# Patient Record
Sex: FEMALE | Race: WHITE | NOT HISPANIC OR LATINO | Employment: UNEMPLOYED | URBAN - METROPOLITAN AREA
[De-identification: names, ages, dates, MRNs, and addresses within clinical notes are randomized per-mention and may not be internally consistent; named-entity substitution may affect disease eponyms.]

---

## 2024-01-28 ENCOUNTER — OFFICE VISIT (OUTPATIENT)
Dept: URGENT CARE | Facility: URGENT CARE | Age: 39
End: 2024-01-28
Payer: COMMERCIAL

## 2024-01-28 ENCOUNTER — ANCILLARY PROCEDURE (OUTPATIENT)
Dept: GENERAL RADIOLOGY | Facility: CLINIC | Age: 39
End: 2024-01-28
Attending: PHYSICIAN ASSISTANT
Payer: COMMERCIAL

## 2024-01-28 VITALS
WEIGHT: 167.4 LBS | TEMPERATURE: 98.2 F | DIASTOLIC BLOOD PRESSURE: 84 MMHG | HEART RATE: 106 BPM | RESPIRATION RATE: 16 BRPM | SYSTOLIC BLOOD PRESSURE: 125 MMHG | OXYGEN SATURATION: 99 %

## 2024-01-28 DIAGNOSIS — S92.514A CLOSED NONDISPLACED FRACTURE OF PROXIMAL PHALANX OF LESSER TOE OF RIGHT FOOT, INITIAL ENCOUNTER: Primary | ICD-10-CM

## 2024-01-28 DIAGNOSIS — S99.921A FOOT INJURY, RIGHT, INITIAL ENCOUNTER: ICD-10-CM

## 2024-01-28 PROCEDURE — 99203 OFFICE O/P NEW LOW 30 MIN: CPT | Performed by: PHYSICIAN ASSISTANT

## 2024-01-28 PROCEDURE — 73630 X-RAY EXAM OF FOOT: CPT | Mod: TC | Performed by: RADIOLOGY

## 2024-01-28 RX ORDER — CITALOPRAM HYDROBROMIDE 40 MG/1
40 TABLET ORAL
COMMUNITY
Start: 2023-10-06

## 2024-01-28 RX ORDER — HYDROCODONE BITARTRATE AND ACETAMINOPHEN 5; 325 MG/1; MG/1
1 TABLET ORAL EVERY 6 HOURS PRN
Qty: 10 TABLET | Refills: 0 | Status: SHIPPED | OUTPATIENT
Start: 2024-01-28

## 2024-01-28 RX ORDER — NORETHINDRONE ACETATE AND ETHINYL ESTRADIOL .03; 1.5 MG/1; MG/1
TABLET ORAL
COMMUNITY

## 2024-01-28 RX ORDER — HYDROCODONE BITARTRATE AND ACETAMINOPHEN 5; 325 MG/1; MG/1
1 TABLET ORAL EVERY 6 HOURS PRN
Qty: 10 TABLET | Refills: 0 | OUTPATIENT
Start: 2024-01-28 | End: 2024-01-28

## 2024-01-28 ASSESSMENT — ENCOUNTER SYMPTOMS
JOINT SWELLING: 1
BACK PAIN: 0
PALPITATIONS: 0
NECK STIFFNESS: 0
WOUND: 0
COLOR CHANGE: 1
ARTHRALGIAS: 1
CARDIOVASCULAR NEGATIVE: 1
CHILLS: 0
MYALGIAS: 1
NECK PAIN: 0
FEVER: 0
FATIGUE: 0

## 2024-01-28 ASSESSMENT — PAIN SCALES - GENERAL: PAINLEVEL: MODERATE PAIN (4)

## 2024-01-28 NOTE — PROGRESS NOTES
Milan Lindsey is a 38 year old, presenting for the following health issues with spouse:  Fall (Pt fell down the stairs. Onset- Saturday ) and Toe Injury (Right pinky toe swelling and pain. )    HPI   Musculoskeletal problem/pain  Onset/Duration: yesterday  Description  Location: R foot  Joint Swelling: Yes with bruising at the 5th toe  Redness: no  Pain: YES  Warmth: no  Intensity:  moderate  Progression of Symptoms:  same  Accompanying signs and symptoms:   Fevers: no  Numbness/tingling/weakness: no  History  Trauma to the area: Yes, sustained a R foot injury after slipping and falling on her stairs at home.  No head or neck injuries or LOC   Recent illness:  no  Previous similar problem: no  Previous evaluation:  no  Precipitating or alleviating factors:  Aggravating factors include: palpation, standing, walking, overuse  Therapies tried and outcome: rest/inactivity, ice, immobilization, naproxen, Ibuprofen, with minimal relief    There is no problem list on file for this patient.    Current Outpatient Medications   Medication    citalopram (CELEXA) 40 MG tablet    norethindrone-ethinyl estradiol (MICROGESTIN 1.5/30) 1.5-30 MG-MCG tablet     No current facility-administered medications for this visit.        Allergies   Allergen Reactions    Cefaclor Hives       Review of Systems   Constitutional:  Negative for chills, fatigue and fever.   Cardiovascular: Negative.  Negative for chest pain, palpitations and leg swelling.   Musculoskeletal:  Positive for arthralgias, gait problem, joint swelling and myalgias. Negative for back pain, neck pain and neck stiffness.   Skin:  Positive for color change. Negative for pallor, rash and wound.           Objective    /84 (BP Location: Left arm, Patient Position: Sitting, Cuff Size: Adult Regular)   Pulse 106   Temp 98.2  F (36.8  C) (Tympanic)   Resp 16   Wt 75.9 kg (167 lb 6.4 oz)   SpO2 99%   There is no height or weight on file to calculate  BMI.  Physical Exam  Vitals and nursing note reviewed.   Constitutional:       General: She is not in acute distress.     Appearance: Normal appearance. She is well-developed and normal weight. She is not ill-appearing.   Musculoskeletal:      Right ankle: Normal. No swelling, ecchymosis or lacerations. No tenderness. Normal range of motion. Anterior drawer test negative. Normal pulse.      Right Achilles Tendon: Normal.      Left ankle: Normal. No swelling, deformity, ecchymosis or lacerations. No tenderness. Normal range of motion. Normal pulse.      Left Achilles Tendon: Normal.      Right foot: Decreased range of motion. Normal capillary refill. Swelling, tenderness and bony tenderness (5th metatarsal and digit with ecchymosis present) present. No deformity, laceration or crepitus. Normal pulse.      Left foot: Normal. Normal range of motion and normal capillary refill. No swelling, laceration, tenderness, bony tenderness or crepitus. Normal pulse.   Skin:     General: Skin is warm and dry.      Capillary Refill: Capillary refill takes less than 2 seconds.   Neurological:      Mental Status: She is alert and oriented to person, place, and time.      Sensory: Sensation is intact. No sensory deficit.      Motor: Motor function is intact.      Gait: Gait abnormal.      Deep Tendon Reflexes: Reflexes are normal and symmetric.   Psychiatric:         Mood and Affect: Mood normal.         Behavior: Behavior normal.         Thought Content: Thought content normal.         Judgment: Judgment normal.     No results found for this or any previous visit (from the past 24 hour(s)).      3V of R foot:  Fracture of the proximal phalanx of the 5th digit.  No dislocations.  No soft tissue swelling or masses.  Per my read.  Will send for overread.      Assessment/Plan:  Closed nondisplaced fracture of proximal phalanx of lesser toe of right foot, initial encounter: Xrays show fx of 5th toe.  This was buddytaped to the adjacent  toe.  Recommend RICE and will give norco#10 prn pain.  Discussed risks and benefits of medication along with side effects, direction for use.  No driving or operating machinery due to sedation. Will send to orthopedics if no improvement.  Recheck in clinic if symptoms worsen or if symptoms do not improve.   -     XR Foot Right G/E 3 Views  -     Orthopedic  Referral  -     HYDROcodone-acetaminophen (NORCO) 5-325 MG tablet; Take 1 tablet by mouth every 6 hours as needed for moderate to severe pain    Foot injury, right, initial encounter        Josephine See EDWAR Noyola

## 2024-12-04 ENCOUNTER — ANCILLARY PROCEDURE (OUTPATIENT)
Dept: GENERAL RADIOLOGY | Facility: CLINIC | Age: 39
End: 2024-12-04
Attending: STUDENT IN AN ORGANIZED HEALTH CARE EDUCATION/TRAINING PROGRAM
Payer: COMMERCIAL

## 2024-12-04 ENCOUNTER — OFFICE VISIT (OUTPATIENT)
Dept: URGENT CARE | Facility: URGENT CARE | Age: 39
End: 2024-12-04
Payer: COMMERCIAL

## 2024-12-04 VITALS
HEART RATE: 113 BPM | OXYGEN SATURATION: 98 % | TEMPERATURE: 97.3 F | WEIGHT: 162.7 LBS | RESPIRATION RATE: 18 BRPM | DIASTOLIC BLOOD PRESSURE: 85 MMHG | SYSTOLIC BLOOD PRESSURE: 132 MMHG

## 2024-12-04 DIAGNOSIS — S99.921A INJURY OF TOE ON RIGHT FOOT, INITIAL ENCOUNTER: ICD-10-CM

## 2024-12-04 DIAGNOSIS — S92.424A CLOSED NONDISPLACED FRACTURE OF DISTAL PHALANX OF RIGHT GREAT TOE, INITIAL ENCOUNTER: Primary | ICD-10-CM

## 2024-12-04 PROCEDURE — 73660 X-RAY EXAM OF TOE(S): CPT | Mod: TC | Performed by: RADIOLOGY

## 2024-12-04 PROCEDURE — 99214 OFFICE O/P EST MOD 30 MIN: CPT | Performed by: STUDENT IN AN ORGANIZED HEALTH CARE EDUCATION/TRAINING PROGRAM

## 2024-12-04 ASSESSMENT — PAIN SCALES - GENERAL: PAINLEVEL_OUTOF10: MODERATE PAIN (5)

## 2024-12-04 NOTE — PATIENT INSTRUCTIONS
Naproxen twice daily.  Elevation, icing to reduce swelling.  Gerard tape toes together. Post-op shoe for extra support.

## 2024-12-04 NOTE — PROGRESS NOTES
ASSESSMENT & PLAN:   Diagnoses and all orders for this visit:  Closed nondisplaced fracture of distal phalanx of right great toe, initial encounter  -     Orthopedic  Referral; Future  -     Ankle/Foot Bracing Supplies Order Post-op Shoe; Right  Injury of toe on right foot, initial encounter  -     XR Toe Right G/E 2 Views; Future      Right great toe injury yesterday. XR shows nondisplaced fracture at base of distal phalanx. Toe was reuben taped and placed in postop shoe. Discussed elevation, ice, naproxen. Referral to orthopedics for follow-up.    At the end of the encounter, I discussed results, diagnosis, medications. Discussed red flags for immediate return to clinic/ER, as well as indications for follow up if no improvement. Patient and/or caregiver understood and agreed to plan. Patient was stable for discharge.    Patient Instructions   Naproxen twice daily.  Elevation, icing to reduce swelling.  Reuben tape toes together. Post-op shoe for extra support.    Return in about 1 week (around 12/11/2024) for specialty referral follow-up.    ------------------------------------------------------------------------  SUBJECTIVE  History was obtained from patient.    Patient presents with:  Toe Injury: Stubbed right big toe yesterday, discolored, bruised, swollen and painful to bend     HPI  Rosalia Dye is a(n) 39 year old female presenting to urgent care for right great toe injury yesterday. Stubbed toe on ottoman while moving furniture. Having pain with weight-bearing. Reports swelling and bruising.    Review of Systems    Current Outpatient Medications   Medication Sig Dispense Refill    citalopram (CELEXA) 40 MG tablet Take 40 mg by mouth      norethindrone-ethinyl estradiol (MICROGESTIN 1.5/30) 1.5-30 MG-MCG tablet Take 1 tablet by mouth 1 (one) time each day. for contraception for 4 months, then stop for period and resume when completed.*      HYDROcodone-acetaminophen (NORCO) 5-325 MG tablet Take 1  tablet by mouth every 6 hours as needed for moderate to severe pain (Patient not taking: Reported on 12/4/2024) 10 tablet 0     Problem List:  There are no relevant problems documented for this patient.    Allergies   Allergen Reactions    Cefaclor Hives         OBJECTIVE  Vitals:    12/04/24 1559   BP: 132/85   BP Location: Left arm   Patient Position: Sitting   Cuff Size: Adult Regular   Pulse: 113   Resp: 18   Temp: 97.3  F (36.3  C)   TempSrc: Tympanic   SpO2: 98%   Weight: 73.8 kg (162 lb 11.2 oz)     Physical Exam   GENERAL: healthy, alert, no acute distress.   PSYCH: mentation appears normal. Normal affect  MSK: right foot -great toe with mild edema and ecchymosis diffusely. Tender to palpation of great toe, most significant at IP joint. No other tenderness in foot. Distal sensation intact. Capillary refill less than 1 second    Xrays were preliminarily reviewed by me -nondisplaced fracture at base of distal phalanx.       Results for orders placed or performed in visit on 12/04/24   XR Toe Right G/E 2 Views     Status: None    Narrative    EXAM: XR TOE RIGHT G/E 2 VIEWS  DATE/TIME: 12/4/2024 4:22 PM    INDICATION: Stubbed toe, pain IP joint; Injury of toe on right foot,  initial encounter.  COMPARISON: Right foot radiographic exam 1/28/2024.      Impression    IMPRESSION: Right great toe alignment is unchanged. Subtle oblique  lucency along the lateral and plantar aspect of the great toe distal  phalangeal base could represent a subtle nondisplaced fracture.  Correlate for focal point tenderness. Great toe soft tissue swelling.  Mild degenerative change at the right first MTP joint with bunion,  unchanged.    NOTE: ABNORMAL REPORT    THE DICTATION ABOVE DESCRIBES AN ABNORMAL REPORT FOR WHICH FOLLOW-UP  IS NEEDED.    LALITHA QIU MD         SYSTEM ID:  [id number]

## 2025-01-16 ENCOUNTER — TRANSCRIBE ORDERS (OUTPATIENT)
Dept: OTHER | Age: 40
End: 2025-01-16

## 2025-01-16 DIAGNOSIS — D75.839 THROMBOCYTOSIS: Primary | ICD-10-CM

## 2025-01-18 NOTE — PROGRESS NOTES
Hematology/Medical Oncology Consultation Note      January 23, 2025    Referring provider:  Key Gregory DO    Reason for visit:  Rosalia Dye is a 39 year old currently unemployed  from [place] who is referred for hematologic evaluation and consultation regarding recent identification of thrombocytosis and absolute lymphocytosis.    Impression:  Progressive absolute lymphocytosis documented since June, 2021  Intermittent neutrophilic leukocytosis which is probably reactive  Mild, intermittent nonprogressive thrombocytosis    Recommendation, plan, instructions:  CBC/differential, peripheral smear, ESR, CRP  JAK2 plus reflex, peripheral blood flow cytometry, BCR-ABL1 testing  Will call back with test results and discuss what to do next including the possibility of bone marrow biopsy particularly if her molecular genetic tests are positive.    Time with patient including review, documentation, history, examination, coordination of care and counseling was 25 minutes.    History of present illness:  A 1/9/2025 CBC/differential revealed a white count of 10,600, hemoglobin 13.0, MCV 92.5, RDW 12.8%, platelets 545,000 with a MPV of 9.9 associated with 4035 absolute lymphocytes and an absolute neutrophil count of 5300 basophilia.    Prior 10/28/2024 iron was 80, TIBC 453, saturation 18%, ferritin 34 for which she took oral iron until about [place] with 1/9/2025 iron 72, TIBC 384, saturation 19%.  A more distant 10/4/2024 CBC/auto differential revealed a white count of 10,100, hemoglobin 12.6, MCV 93.9, RDW 12.7%, platelets 506,000 with an absolute lymphocyte count of 3020.    She had an [facility] blood test in June, 2021 with a white count 11,200, hemoglobin 13.8, MCV 96, platelets 481,000 with ANC of 7800 and after lymphocyte count of 2400.  A more remote July, 2013 CBC revealed a white count of 10,400, hemoglobin 13.3, MCV 90, platelets 379,000, absolute neutrophil count 6400.    She has always been  anaid [place] from the [facility].  She is not a .  No children.   is a  for the [employer].  She has 1 sister in good health.  Both of her parents are in the 60s and in good health.  She does not smoke and uses alcohol infrequently.    Past medical history:  No past medical history on file.  She had been on citalopram 40 mg for about 16 years for anxiety/depression and has been trying to wean off of citalopram as of 11/1/2024 and felt hot with temperatures of 99 associated with episodes of sweating and tachycardia.    Family history:          Medications:  Current Outpatient Medications   Medication Sig Dispense Refill    citalopram (CELEXA) 10 MG tablet Take 20 mg by mouth daily.      citalopram (CELEXA) 20 MG tablet Take 10 mg by mouth daily.      eletriptan (RELPAX) 40 MG tablet Take 40 mg by mouth at onset of headache.      hydrOXYzine zuly (VISTARIL) 25 MG capsule Take 25 mg by mouth 3 times daily as needed.      multivitamin w/minerals (MULTI-VITAMIN) tablet Take 1 tablet by mouth daily.      Nutrisource Fiber PO packet 3 times daily (with meals).      norethindrone-ethinyl estradiol (MICROGESTIN 1.5/30) 1.5-30 MG-MCG tablet Take 1 tablet by mouth 1 (one) time each day. for contraception for 4 months, then stop for period and resume when completed.*       No current facility-administered medications for this visit.       Allergies:  Allergies   Allergen Reactions    Cefaclor Hives       Review of systems:  Lifetime headaches without change  Recent switch from oral iron which caused constipation to multiple vitamins    Except as note above, the patient denies:  Headache, double vision, change in vision, hearing loss, tinnitus;  Dyspnea, chest pain, palpitations, pleurisy or hemoptysis;  Anorexia, nausea, vomiting, diarrhea, constipation, rectal bleeding bleeding, change in bowel habits;  Urinary frequency, burning or hematuria;  Fever, chills, sweats, change in appetite;  Bone or back  "pain; skin rash, joint swelling, new lumps;  Unexplained, bleeding or bruising, tingling numbness, change in gait;    Physical examination:  /84 (BP Location: Left arm)   Pulse (!) 118   Ht 1.676 m (5' 6\")   Wt 74.1 kg (163 lb 6.4 oz)   LMP 10/09/2024 (Exact Date)   SpO2 98%   BMI 26.37 kg/m      The patient is alert and oriented.   Throat and oral mucosa are normal-appearing.   Adenopathy is absent in the neck, axilla, groin and supraclavicular fossa;   Lungs are clear to auscultation and percussion without rales, wheezes or rubs; Heart rhythm is regular, heart sounds are without murmurs or gallops  Abdomen is soft and flat without hepatosplenomegaly, masses, ascites or tenderness;  Extremities and skin reveal no unusual skin lesions, joint swelling or edema;        Kamran Arita MD            "

## 2025-01-23 ENCOUNTER — ONCOLOGY VISIT (OUTPATIENT)
Dept: ONCOLOGY | Facility: CLINIC | Age: 40
End: 2025-01-23
Attending: INTERNAL MEDICINE
Payer: COMMERCIAL

## 2025-01-23 VITALS
OXYGEN SATURATION: 98 % | HEART RATE: 118 BPM | DIASTOLIC BLOOD PRESSURE: 84 MMHG | HEIGHT: 66 IN | BODY MASS INDEX: 26.26 KG/M2 | SYSTOLIC BLOOD PRESSURE: 132 MMHG | WEIGHT: 163.4 LBS

## 2025-01-23 DIAGNOSIS — D75.839 THROMBOCYTOSIS: ICD-10-CM

## 2025-01-23 DIAGNOSIS — D72.820 LYMPHOCYTOSIS: Primary | ICD-10-CM

## 2025-01-23 DIAGNOSIS — D72.828 NEUTROPHILIA: ICD-10-CM

## 2025-01-23 LAB
ALBUMIN SERPL BCG-MCNC: 4.4 G/DL (ref 3.5–5.2)
ALP SERPL-CCNC: 69 U/L (ref 40–150)
ALT SERPL W P-5'-P-CCNC: 52 U/L (ref 0–50)
ANION GAP SERPL CALCULATED.3IONS-SCNC: 12 MMOL/L (ref 7–15)
AST SERPL W P-5'-P-CCNC: 33 U/L (ref 0–45)
BASOPHILS # BLD AUTO: 0.1 10E3/UL (ref 0–0.2)
BASOPHILS NFR BLD AUTO: 1 %
BILIRUB SERPL-MCNC: 0.3 MG/DL
BUN SERPL-MCNC: 7.9 MG/DL (ref 6–20)
CALCIUM SERPL-MCNC: 9.8 MG/DL (ref 8.8–10.4)
CHLORIDE SERPL-SCNC: 102 MMOL/L (ref 98–107)
CREAT SERPL-MCNC: 0.82 MG/DL (ref 0.51–0.95)
CRP SERPL-MCNC: 3.37 MG/L
EGFRCR SERPLBLD CKD-EPI 2021: >90 ML/MIN/1.73M2
EOSINOPHIL # BLD AUTO: 0.1 10E3/UL (ref 0–0.7)
EOSINOPHIL NFR BLD AUTO: 1 %
ERYTHROCYTE [DISTWIDTH] IN BLOOD BY AUTOMATED COUNT: 13.1 % (ref 10–15)
ERYTHROCYTE [SEDIMENTATION RATE] IN BLOOD BY WESTERGREN METHOD: 2 MM/HR (ref 0–20)
GLUCOSE SERPL-MCNC: 91 MG/DL (ref 70–99)
HCO3 SERPL-SCNC: 25 MMOL/L (ref 22–29)
HCT VFR BLD AUTO: 41.1 % (ref 35–47)
HGB BLD-MCNC: 13.8 G/DL (ref 11.7–15.7)
IMM GRANULOCYTES # BLD: 0 10E3/UL
IMM GRANULOCYTES NFR BLD: 0 %
LYMPHOCYTES # BLD AUTO: 2.8 10E3/UL (ref 0.8–5.3)
LYMPHOCYTES NFR BLD AUTO: 30 %
MCH RBC QN AUTO: 30.1 PG (ref 26.5–33)
MCHC RBC AUTO-ENTMCNC: 33.6 G/DL (ref 31.5–36.5)
MCV RBC AUTO: 90 FL (ref 78–100)
MONOCYTES # BLD AUTO: 0.7 10E3/UL (ref 0–1.3)
MONOCYTES NFR BLD AUTO: 7 %
NEUTROPHILS # BLD AUTO: 5.7 10E3/UL (ref 1.6–8.3)
NEUTROPHILS NFR BLD AUTO: 61 %
NRBC # BLD AUTO: 0 10E3/UL
NRBC BLD AUTO-RTO: 0 /100
PLATELET # BLD AUTO: 478 10E3/UL (ref 150–450)
POTASSIUM SERPL-SCNC: 4.3 MMOL/L (ref 3.4–5.3)
PROT SERPL-MCNC: 7.9 G/DL (ref 6.4–8.3)
RBC # BLD AUTO: 4.59 10E6/UL (ref 3.8–5.2)
RETICS # AUTO: 0.09 10E6/UL
RETICS/RBC NFR AUTO: 1.9 %
SODIUM SERPL-SCNC: 139 MMOL/L (ref 135–145)
WBC # BLD AUTO: 9.4 10E3/UL (ref 4–11)

## 2025-01-23 PROCEDURE — 81279 JAK2 GENE TRGT SEQUENCE ALYS: CPT | Performed by: INTERNAL MEDICINE

## 2025-01-23 PROCEDURE — 81339 MPL GENE SEQ ALYS EXON 10: CPT | Performed by: INTERNAL MEDICINE

## 2025-01-23 PROCEDURE — 99213 OFFICE O/P EST LOW 20 MIN: CPT | Performed by: INTERNAL MEDICINE

## 2025-01-23 PROCEDURE — G0452 MOLECULAR PATHOLOGY INTERPR: HCPCS | Mod: 26 | Performed by: PATHOLOGY

## 2025-01-23 PROCEDURE — 81270 JAK2 GENE: CPT | Performed by: INTERNAL MEDICINE

## 2025-01-23 PROCEDURE — 86140 C-REACTIVE PROTEIN: CPT | Performed by: INTERNAL MEDICINE

## 2025-01-23 PROCEDURE — 82565 ASSAY OF CREATININE: CPT | Performed by: INTERNAL MEDICINE

## 2025-01-23 PROCEDURE — 85041 AUTOMATED RBC COUNT: CPT | Performed by: INTERNAL MEDICINE

## 2025-01-23 PROCEDURE — 81338 MPL GENE COMMON VARIANTS: CPT | Performed by: INTERNAL MEDICINE

## 2025-01-23 PROCEDURE — 82947 ASSAY GLUCOSE BLOOD QUANT: CPT | Performed by: INTERNAL MEDICINE

## 2025-01-23 PROCEDURE — 85652 RBC SED RATE AUTOMATED: CPT | Performed by: INTERNAL MEDICINE

## 2025-01-23 PROCEDURE — 85004 AUTOMATED DIFF WBC COUNT: CPT | Performed by: INTERNAL MEDICINE

## 2025-01-23 PROCEDURE — 85045 AUTOMATED RETICULOCYTE COUNT: CPT | Performed by: INTERNAL MEDICINE

## 2025-01-23 RX ORDER — ELETRIPTAN HYDROBROMIDE 40 MG/1
40 TABLET, FILM COATED ORAL
COMMUNITY
Start: 2024-01-01

## 2025-01-23 RX ORDER — HYDROXYZINE PAMOATE 25 MG/1
25 CAPSULE ORAL 3 TIMES DAILY PRN
COMMUNITY
Start: 2022-01-01

## 2025-01-23 RX ORDER — CITALOPRAM HYDROBROMIDE 10 MG/1
20 TABLET ORAL DAILY
COMMUNITY
Start: 2025-01-15

## 2025-01-23 RX ORDER — GUAR GUM
PACKET (EA) ORAL
COMMUNITY

## 2025-01-23 RX ORDER — CITALOPRAM HYDROBROMIDE 20 MG/1
10 TABLET ORAL DAILY
COMMUNITY
Start: 2015-01-15

## 2025-01-23 RX ORDER — MULTIVITAMIN
1 TABLET ORAL DAILY
COMMUNITY

## 2025-01-23 ASSESSMENT — PAIN SCALES - GENERAL: PAINLEVEL_OUTOF10: NO PAIN (0)

## 2025-01-23 NOTE — LETTER
1/23/2025      Rosalia Dye  [address]  [place] [address]      Dear Colleague,    Thank you for referring your patient, Rosalia Dye, to the [facility]. Please see a copy of my visit note below.    Hematology/Medical Oncology Consultation Note      January 23, 2025    Referring provider:  Key Gregory DO    Reason for visit:  Rosalia Dye is a 39 year old currently unemployed  from [place] who is referred for hematologic evaluation and consultation regarding recent identification of thrombocytosis and absolute lymphocytosis.    Impression:  Progressive absolute lymphocytosis documented since June, 2021  Intermittent neutrophilic leukocytosis which is probably reactive  Mild, intermittent nonprogressive thrombocytosis    Recommendation, plan, instructions:  CBC/differential, peripheral smear, ESR, CRP  JAK2 plus reflex, peripheral blood flow cytometry, BCR-ABL1 testing  Will call back with test results and discuss what to do next including the possibility of bone marrow biopsy particularly if her molecular genetic tests are positive.    Time with patient including review, documentation, history, examination, coordination of care and counseling was 25 minutes.    History of present illness:  A 1/9/2025 CBC/differential revealed a white count of 10,600, hemoglobin 13.0, MCV 92.5, RDW 12.8%, platelets 545,000 with a MPV of 9.9 associated with 4035 absolute lymphocytes and an absolute neutrophil count of 5300 basophilia.    Prior 10/28/2024 iron was 80, TIBC 453, saturation 18%, ferritin 34 for which she took oral iron until about [place] with 1/9/2025 iron 72, TIBC 384, saturation 19%.  A more distant 10/4/2024 CBC/auto differential revealed a white count of 10,100, hemoglobin 12.6, MCV 93.9, RDW 12.7%, platelets 506,000 with an absolute lymphocyte count of 3020.    She had an [facility] blood test in June, 2021 with a white count 11,200, hemoglobin  13.8, MCV 96, platelets 481,000 with ANC of 7800 and after lymphocyte count of 2400.  A more remote July, 2013 CBC revealed a white count of 10,400, hemoglobin 13.3, MCV 90, platelets 379,000, absolute neutrophil count 6400.    She has always been well.  Dipesh from the [employer].  She is not a .  No children.   is a  for the [employer].  She has 1 sister in good health.  Both of her parents are in the 60s and in good health.  She does not smoke and uses alcohol infrequently.    Past medical history:  No past medical history on file.  She had been on citalopram 40 mg for about 16 years for anxiety/depression and has been trying to wean off of citalopram as of 11/1/2024 and felt hot with temperatures of 99 associated with episodes of sweating and tachycardia.    Family history:          Medications:  Current Outpatient Medications   Medication Sig Dispense Refill     citalopram (CELEXA) 10 MG tablet Take 20 mg by mouth daily.       citalopram (CELEXA) 20 MG tablet Take 10 mg by mouth daily.       eletriptan (RELPAX) 40 MG tablet Take 40 mg by mouth at onset of headache.       hydrOXYzine zuly (VISTARIL) 25 MG capsule Take 25 mg by mouth 3 times daily as needed.       multivitamin w/minerals (MULTI-VITAMIN) tablet Take 1 tablet by mouth daily.       Nutrisource Fiber PO packet 3 times daily (with meals).       norethindrone-ethinyl estradiol (MICROGESTIN 1.5/30) 1.5-30 MG-MCG tablet Take 1 tablet by mouth 1 (one) time each day. for contraception for 4 months, then stop for period and resume when completed.*       No current facility-administered medications for this visit.       Allergies:  Allergies   Allergen Reactions     Cefaclor Hives       Review of systems:  Lifetime headaches without change  Recent switch from oral iron which caused constipation to multiple vitamins    Except as note above, the patient denies:  Headache, double vision, change in vision, hearing loss, tinnitus;  Dyspnea,  "chest pain, palpitations, pleurisy or hemoptysis;  Anorexia, nausea, vomiting, diarrhea, constipation, rectal bleeding bleeding, change in bowel habits;  Urinary frequency, burning or hematuria;  Fever, chills, sweats, change in appetite;  Bone or back pain; skin rash, joint swelling, new lumps;  Unexplained, bleeding or bruising, tingling numbness, change in gait;    Physical examination:  /84 (BP Location: Left arm)   Pulse (!) 118   Ht 1.676 m (5' 6\")   Wt 74.1 kg (163 lb 6.4 oz)   LMP 10/09/2024 (Exact Date)   SpO2 98%   BMI 26.37 kg/m      The patient is alert and oriented.   Throat and oral mucosa are normal-appearing.   Adenopathy is absent in the neck, axilla, groin and supraclavicular fossa;   Lungs are clear to auscultation and percussion without rales, wheezes or rubs; Heart rhythm is regular, heart sounds are without murmurs or gallops  Abdomen is soft and flat without hepatosplenomegaly, masses, ascites or tenderness;  Extremities and skin reveal no unusual skin lesions, joint swelling or edema;        Kamran Arita MD              Again, thank you for allowing me to participate in the care of your patient.        Sincerely,        Kamran Arita MD    Electronically signed"

## 2025-01-23 NOTE — NURSING NOTE
"Oncology Rooming Note    January 23, 2025 1:55 PM   Rosalia Dye is a 39 year old female who presents for:    Chief Complaint   Patient presents with    Oncology Clinic Visit     Initial Vitals: /84 (BP Location: Left arm)   Pulse (!) 118   Ht 1.676 m (5' 6\")   Wt 74.1 kg (163 lb 6.4 oz)   LMP 10/09/2024 (Exact Date)   SpO2 98%   BMI 26.37 kg/m   Estimated body mass index is 26.37 kg/m  as calculated from the following:    Height as of this encounter: 1.676 m (5' 6\").    Weight as of this encounter: 74.1 kg (163 lb 6.4 oz). Body surface area is 1.86 meters squared.  No Pain (0) Comment: Data Unavailable   Patient's last menstrual period was 10/09/2024 (exact date).  Allergies reviewed: Yes  Medications reviewed: Yes    Medications: Medication refills not needed today.  Pharmacy name entered into [employer]: [id number] - [facility] [id number]    Frailty Screening:   Is the patient here for a new oncology consult visit in cancer care? 1. Yes. Over the past month, have you experienced difficulty or required a caregiver to assist with:   1. Balance, walking or general mobility (including any falls)? NO  2. Completion of self-care tasks such as bathing, dressing, toileting, grooming/hygiene?  NO  3. Concentration or memory that affects your daily life?  NO       Clinical concerns: Patient will discuss concerns with provider.       Timbo Warner MA            "

## 2025-02-05 LAB
INTERPRETATION SERPL IEP-IMP: NORMAL
LAB TEST RESULTS REPORTED IN RPTPERIOD: NORMAL
LOCATION OF TASK: NORMAL
SEQUENCING METHOD PNL BLD/T: NORMAL
SPECIMEN TYPE: NORMAL